# Patient Record
Sex: FEMALE | Race: WHITE | NOT HISPANIC OR LATINO | URBAN - METROPOLITAN AREA
[De-identification: names, ages, dates, MRNs, and addresses within clinical notes are randomized per-mention and may not be internally consistent; named-entity substitution may affect disease eponyms.]

---

## 2021-05-28 ENCOUNTER — EMERGENCY (EMERGENCY)
Facility: HOSPITAL | Age: 21
LOS: 0 days | Discharge: HOME | End: 2021-05-28
Attending: EMERGENCY MEDICINE | Admitting: EMERGENCY MEDICINE
Payer: MEDICAID

## 2021-05-28 VITALS
HEIGHT: 64 IN | WEIGHT: 104.94 LBS | SYSTOLIC BLOOD PRESSURE: 122 MMHG | OXYGEN SATURATION: 100 % | RESPIRATION RATE: 18 BRPM | DIASTOLIC BLOOD PRESSURE: 79 MMHG | HEART RATE: 75 BPM | TEMPERATURE: 99 F

## 2021-05-28 DIAGNOSIS — R42 DIZZINESS AND GIDDINESS: ICD-10-CM

## 2021-05-28 DIAGNOSIS — R10.30 LOWER ABDOMINAL PAIN, UNSPECIFIED: ICD-10-CM

## 2021-05-28 DIAGNOSIS — Z87.42 PERSONAL HISTORY OF OTHER DISEASES OF THE FEMALE GENITAL TRACT: ICD-10-CM

## 2021-05-28 DIAGNOSIS — N83.201 UNSPECIFIED OVARIAN CYST, RIGHT SIDE: ICD-10-CM

## 2021-05-28 DIAGNOSIS — Z88.1 ALLERGY STATUS TO OTHER ANTIBIOTIC AGENTS STATUS: ICD-10-CM

## 2021-05-28 LAB
ALBUMIN SERPL ELPH-MCNC: 4.5 G/DL — SIGNIFICANT CHANGE UP (ref 3.5–5.2)
ALP SERPL-CCNC: 43 U/L — SIGNIFICANT CHANGE UP (ref 30–115)
ALT FLD-CCNC: 8 U/L — SIGNIFICANT CHANGE UP (ref 0–41)
ANION GAP SERPL CALC-SCNC: 11 MMOL/L — SIGNIFICANT CHANGE UP (ref 7–14)
APPEARANCE UR: CLEAR — SIGNIFICANT CHANGE UP
AST SERPL-CCNC: 15 U/L — SIGNIFICANT CHANGE UP (ref 0–41)
BASOPHILS # BLD AUTO: 0.06 K/UL — SIGNIFICANT CHANGE UP (ref 0–0.2)
BASOPHILS NFR BLD AUTO: 0.6 % — SIGNIFICANT CHANGE UP (ref 0–1)
BILIRUB SERPL-MCNC: 0.4 MG/DL — SIGNIFICANT CHANGE UP (ref 0.2–1.2)
BILIRUB UR-MCNC: NEGATIVE — SIGNIFICANT CHANGE UP
BUN SERPL-MCNC: 15 MG/DL — SIGNIFICANT CHANGE UP (ref 10–20)
CALCIUM SERPL-MCNC: 9.5 MG/DL — SIGNIFICANT CHANGE UP (ref 8.5–10.1)
CHLORIDE SERPL-SCNC: 106 MMOL/L — SIGNIFICANT CHANGE UP (ref 98–110)
CO2 SERPL-SCNC: 20 MMOL/L — SIGNIFICANT CHANGE UP (ref 17–32)
COLOR SPEC: SIGNIFICANT CHANGE UP
CREAT SERPL-MCNC: 0.8 MG/DL — SIGNIFICANT CHANGE UP (ref 0.7–1.5)
DIFF PNL FLD: NEGATIVE — SIGNIFICANT CHANGE UP
EOSINOPHIL # BLD AUTO: 0.06 K/UL — SIGNIFICANT CHANGE UP (ref 0–0.7)
EOSINOPHIL NFR BLD AUTO: 0.6 % — SIGNIFICANT CHANGE UP (ref 0–8)
GLUCOSE SERPL-MCNC: 98 MG/DL — SIGNIFICANT CHANGE UP (ref 70–99)
GLUCOSE UR QL: NEGATIVE — SIGNIFICANT CHANGE UP
HCG SERPL QL: NEGATIVE — SIGNIFICANT CHANGE UP
HCT VFR BLD CALC: 40.1 % — SIGNIFICANT CHANGE UP (ref 37–47)
HGB BLD-MCNC: 14.2 G/DL — SIGNIFICANT CHANGE UP (ref 12–16)
IMM GRANULOCYTES NFR BLD AUTO: 0.4 % — HIGH (ref 0.1–0.3)
KETONES UR-MCNC: ABNORMAL
LACTATE SERPL-SCNC: 1.5 MMOL/L — SIGNIFICANT CHANGE UP (ref 0.7–2)
LEUKOCYTE ESTERASE UR-ACNC: NEGATIVE — SIGNIFICANT CHANGE UP
LYMPHOCYTES # BLD AUTO: 1.78 K/UL — SIGNIFICANT CHANGE UP (ref 1.2–3.4)
LYMPHOCYTES # BLD AUTO: 17.3 % — LOW (ref 20.5–51.1)
MCHC RBC-ENTMCNC: 30.8 PG — SIGNIFICANT CHANGE UP (ref 27–31)
MCHC RBC-ENTMCNC: 35.4 G/DL — SIGNIFICANT CHANGE UP (ref 32–37)
MCV RBC AUTO: 87 FL — SIGNIFICANT CHANGE UP (ref 81–99)
MONOCYTES # BLD AUTO: 0.93 K/UL — HIGH (ref 0.1–0.6)
MONOCYTES NFR BLD AUTO: 9 % — SIGNIFICANT CHANGE UP (ref 1.7–9.3)
NEUTROPHILS # BLD AUTO: 7.41 K/UL — HIGH (ref 1.4–6.5)
NEUTROPHILS NFR BLD AUTO: 72.1 % — SIGNIFICANT CHANGE UP (ref 42.2–75.2)
NITRITE UR-MCNC: NEGATIVE — SIGNIFICANT CHANGE UP
NRBC # BLD: 0 /100 WBCS — SIGNIFICANT CHANGE UP (ref 0–0)
PH UR: 7.5 — SIGNIFICANT CHANGE UP (ref 5–8)
PLATELET # BLD AUTO: 256 K/UL — SIGNIFICANT CHANGE UP (ref 130–400)
POTASSIUM SERPL-MCNC: 4.1 MMOL/L — SIGNIFICANT CHANGE UP (ref 3.5–5)
POTASSIUM SERPL-SCNC: 4.1 MMOL/L — SIGNIFICANT CHANGE UP (ref 3.5–5)
PROT SERPL-MCNC: 6.4 G/DL — SIGNIFICANT CHANGE UP (ref 6–8)
PROT UR-MCNC: NEGATIVE — SIGNIFICANT CHANGE UP
RBC # BLD: 4.61 M/UL — SIGNIFICANT CHANGE UP (ref 4.2–5.4)
RBC # FLD: 11.6 % — SIGNIFICANT CHANGE UP (ref 11.5–14.5)
SODIUM SERPL-SCNC: 137 MMOL/L — SIGNIFICANT CHANGE UP (ref 135–146)
SP GR SPEC: 1.01 — SIGNIFICANT CHANGE UP (ref 1.01–1.03)
UROBILINOGEN FLD QL: SIGNIFICANT CHANGE UP
WBC # BLD: 10.28 K/UL — SIGNIFICANT CHANGE UP (ref 4.8–10.8)
WBC # FLD AUTO: 10.28 K/UL — SIGNIFICANT CHANGE UP (ref 4.8–10.8)

## 2021-05-28 PROCEDURE — 76770 US EXAM ABDO BACK WALL COMP: CPT | Mod: 26

## 2021-05-28 PROCEDURE — 76830 TRANSVAGINAL US NON-OB: CPT | Mod: 26

## 2021-05-28 PROCEDURE — 99285 EMERGENCY DEPT VISIT HI MDM: CPT

## 2021-05-28 RX ORDER — IOHEXOL 300 MG/ML
30 INJECTION, SOLUTION INTRAVENOUS ONCE
Refills: 0 | Status: COMPLETED | OUTPATIENT
Start: 2021-05-28 | End: 2021-05-28

## 2021-05-28 RX ORDER — MORPHINE SULFATE 50 MG/1
4 CAPSULE, EXTENDED RELEASE ORAL ONCE
Refills: 0 | Status: DISCONTINUED | OUTPATIENT
Start: 2021-05-28 | End: 2021-05-28

## 2021-05-28 RX ORDER — SODIUM CHLORIDE 9 MG/ML
1000 INJECTION, SOLUTION INTRAVENOUS ONCE
Refills: 0 | Status: COMPLETED | OUTPATIENT
Start: 2021-05-28 | End: 2021-05-28

## 2021-05-28 RX ORDER — ONDANSETRON 8 MG/1
4 TABLET, FILM COATED ORAL ONCE
Refills: 0 | Status: COMPLETED | OUTPATIENT
Start: 2021-05-28 | End: 2021-05-28

## 2021-05-28 RX ADMIN — SODIUM CHLORIDE 1000 MILLILITER(S): 9 INJECTION, SOLUTION INTRAVENOUS at 19:23

## 2021-05-28 RX ADMIN — MORPHINE SULFATE 4 MILLIGRAM(S): 50 CAPSULE, EXTENDED RELEASE ORAL at 19:23

## 2021-05-28 RX ADMIN — ONDANSETRON 4 MILLIGRAM(S): 8 TABLET, FILM COATED ORAL at 19:23

## 2021-05-28 RX ADMIN — IOHEXOL 30 MILLILITER(S): 300 INJECTION, SOLUTION INTRAVENOUS at 20:29

## 2021-05-28 NOTE — ED PROVIDER NOTE - CARE PROVIDER_API CALL
TOSHIA CHUCHO  [id number]  [id number]  [place] [address]  Phone: ()-  Fax: ()-  Follow Up Time:    [facility]  [id number]  [id number]  [place] [address]  Phone: ()-  Fax: ()-  Follow Up Time:     FRACISCO CASTANO  [id number]  [address]  OMAIRA YAP, Phys,    Phone: ()-  Fax: ()-  Follow Up Time:

## 2021-05-28 NOTE — ED PROVIDER NOTE - OBJECTIVE STATEMENT
19 yo F with PMHx of ruptured L ovarian cyst, G0 who presents with acute onset of severe suprapubic pain radiating to epigastrium which started 45min prior to arrival immediately after having sexual intercourse. Sx worse with movement/lying flat. No fever, chills, nausea, vomiting, dysuria, vaginal bleeding/discharge, diarrhea. No FB insertion. LMP 35 days ago. 21 yo F with PMHx of ruptured L ovarian cyst, G0 who presents with acute onset of severe suprapubic pain radiating to epigastrium which started 45min prior to arrival immediately after having sexual intercourse. Sx worse with movement/lying flat. No fever, chills, nausea, vomiting, dysuria, vaginal bleeding/discharge, diarrhea. No FB insertion. LMP 35 days ago.    OBGYN: Dr. Duran

## 2021-05-28 NOTE — ED PROVIDER NOTE - PROVIDER TOKENS
PROVIDER:[TOKEN:[[id number]]] PROVIDER:[TOKEN:[[id number]]],PROVIDER:[TOKEN:[[id number]]]

## 2021-05-28 NOTE — ED PROVIDER NOTE - PHYSICAL EXAMINATION
CONSTITUTIONAL: well developed, nontoxic appearing, in no acute distress, speaking in full sentences  SKIN: warm, dry, no rash, cap refill < 2 seconds  HEENT: normocephalic, atraumatic, no conjunctival erythema, moist mucous membranes, patent airway  NECK: supple  CV:  regular rate, regular rhythm, 2+ radial pulses bilaterally  RESP: no wheezes, no rales, no rhonchi, normal work of breathing  ABD: soft, suprapubic and epigastric tenderness, nondistended, no rebound, no guarding  MSK: normal ROM, no cyanosis, no edema  NEURO: alert, oriented, grossly unremarkable  PSYCH: cooperative, appropriate, tearful

## 2021-05-28 NOTE — ED PROVIDER NOTE - PATIENT PORTAL LINK FT
You can access the FollowMyHealth Patient Portal offered by [facility] by registering at the following website: [email or web address]. By joining [employer]’s FollowMyHealth portal, you will also be able to view your health information using other applications (apps) compatible with our system.

## 2021-05-28 NOTE — ED PROVIDER NOTE - PROGRESS NOTE DETAILS
TC: 19 yo F with PMHx of ruptured L ovarian cyst, G0 who presents with acute onset suprapubic pain after sexual intercourse. Vitals wnl in ED. No peritonitic signs. Ordered labs, urine, US. Given IVF, zofran, morphine (pt states she gets nauseous after opiates). Will reassess. TC: Reassessed pt, reports pain now improved to 1-2/10 in severity although now feels some RLQ pain. Labs wnl. Discussed starting po contrast now for possible CT abd in case US is equivocal and pt is amenable with plan. TC: US shows ruptured R ovarian cyst. Repeat exam improved. Pt also with bilateral hydro which can be seen with full bladder (US performed on full bladder). Less likely bilateral obstructing stones. Urine sent out. TC: US shows ruptured R ovarian cyst. Repeat exam improved. Pt also with bilateral hydro which can be seen with full bladder (US performed on full bladder). Less likely bilateral obstructing stones. Ua negative. Instructed to f/u with OBGYN for cyst and PMD for repeat US if needed. Strict ED return precautions given. Pt verbalized understanding and was agreeable with plan.

## 2021-05-28 NOTE — ED PROVIDER NOTE - ATTENDING CONTRIBUTION TO CARE
21yo F with PMHx ruptured ovarian cyst, presents for sharp suprapubic pain radiating to epigastrium for past hour. Pain began immediately after having sexual intercourse, worse with movement and palpation. LMP 35 days ago, patient is on OCPs, states periods are normally about 33-35 days apart. Also notes she has been having right pelvic pain for past few weeks intermittently for few weeks, attributed it to cyst. Denies fever, headache, lightheadedness, CP, SOB, cough, nausea, vomiting, diarrhea, dysuria, hematuria, abnormal vaginal bleeding, discharge.     Vital signs reviewed  GENERAL: Patient nontoxic appearing, NAD  HEAD: NCAT  EYES: Anicteric  ENT: MMM  RESPIRATORY: Normal respiratory effort. CTA B/L. No wheezing, rales, rhonchi  CARDIOVASCULAR: Regular rate and rhythm  ABDOMEN: Soft. Nondistended. Suprapubic tenderness. No guarding or rebound. No CVA tenderness.  MUSCULOSKELETAL/EXTREMITIES: Brisk cap refill.   SKIN:  Warm and dry  NEURO: AAOx3. No gross FND.

## 2021-05-28 NOTE — ED PROVIDER NOTE - DATE/TIME 3
Please call patient and let her know she needs to make her appointment with neurology. I will refill her medication one more time. Medications won't be prescribed unless neurology give input as how to treat her chronic headaches.    28-May-2021 22:04

## 2021-05-28 NOTE — ED PROVIDER NOTE - NSFOLLOWUPINSTRUCTIONS_ED_ALL_ED_FT
Ruptured Ovarian Cyst    WHAT YOU NEED TO KNOW:    What is a ruptured ovarian cyst? A ruptured ovarian cyst is a cyst that breaks open. A cyst is a sac that grows on an ovary. This sac usually contains fluid, but may sometimes have blood or tissue in it. A large cyst that ruptures may lead to problems that need immediate care.    What causes or increases my risk for a ruptured ovarian cyst? You may be at higher risk for a ruptured ovarian cyst if you have polycystic ovarian syndrome (PCOS). PCOS causes many cysts to grow on your ovary. Any of the following can lead to a ruptured cyst:     - Hormone changes around the time of your monthly period   - Pressure on the cyst from sports, sex, or an injury to the area (usually large cysts)   - Pregnancy    What are the signs and symptoms of a ruptured ovarian cyst? You may have no signs or symptoms, or you may have any of the following:     - Pain that can range from mild to severe or be mild at first but become severe quickly   - Sudden, sharp, or stabbing pain that happens on one side   - Pain that starts during activity or sex, or that gets worse when you move   - Tenderness in the area of your ovary   - A low fever   - Nausea, vomiting, or dizziness    How is a ruptured ovarian cyst diagnosed? Your healthcare provider will examine you and ask about your symptoms. If you have pain, tell your provider what you were doing when you first felt the pain. Include anything that helps or increases the pain. Tell your provider if you or anyone in your family has a history of breast or ovarian cancer, or PCOS. You may need any of the following:     Blood tests are used to check for pregnancy or an ectopic pregnancy. This is when a fertilized egg is growing in a fallopian tube instead of the womb. You may also need to have hormone levels checked. Blood tests may also be used to check for signs of an infection or tumor.     Ultrasound pictures may show a cyst on your ovary. For this test, an ultrasound wand is inserted into your vagina and guided up toward your uterus. This helps your healthcare provider get a closer look at your ovaries.     How is a ruptured ovarian cyst treated? Treatment depends on your age, the size of the cyst, and if it caused problems that need treatment. Treatment may not be needed if the cyst was small or your body absorbed the fluid that came out of the cyst when it ruptured. You may need any of the following:     NSAIDs, such as ibuprofen, help decrease swelling, pain, and fever. This medicine is available with or without a doctor's order. NSAIDs can cause stomach bleeding or kidney problems in certain people. If you take blood thinner medicine, always ask if NSAIDs are safe for you. Always read the medicine label and follow directions. Do not give these medicines to children under 6 months of age without direction from your child's healthcare provider.    Prescription pain medicine may be given. Ask your healthcare provider how to take this medicine safely. Some prescription pain medicines contain acetaminophen. Do not take other medicines that contain acetaminophen without talking to your healthcare provider. Too much acetaminophen may cause liver damage. Prescription pain medicine may cause constipation. Ask your healthcare provider how to prevent or treat constipation.     Antibiotics may be needed to prevent or fight an infection caused by bacteria.     Surgery may be needed to remove fluid or blood in the area of the ruptured cyst. The outside of the ruptured cyst may also need to be removed.     What can I do to manage or prevent a ruptured ovarian cyst?     Apply heat where you have pain, as directed. Heat can help relieve mild pain. Use a heating pad (set on low) or hot water bottle. Wrap the pad or bottle in a towel before you apply it to your skin. Apply heat for 20 minutes every hour, or as directed. A warm bath may also help relieve the pain.    Ask when to come in for a follow-up examination. You may need another ultrasound 6 weeks after your cyst was treated. This will help make sure the cyst is no longer growing or causing health problems. You may also need ultrasound tests for 2 or 3 monthly periods to see how hormones affect your ovaries.    Ask about birth control pills. These may help reduce your risk for cysts. Ask your healthcare provider if birth control pills are right for you. The risk for a blood clot is higher if you take birth control pills, especially if you are older than 35 or smoke.     Have a pelvic exam every year. This may also be called a well woman visit. The exam will include a Pap smear to check for certain cancers. Your healthcare provider will also press on your abdomen to check for lumps or other problems. A pelvic exam can help find problems early. This makes treatment easier and more effective. Tell your healthcare provider if you notice any changes in your monthly periods. Examples include periods that start on a different day than usual, or are lighter or heavier than usual. Tell your provider if you have worse pain than usual, or if the pain is different than you had before.    Call 911 for any of the following:   - You are too weak or dizzy to stand up.    When should I seek immediate care?   - You have severe pain in your pelvis or in your abdomen.   - You have pain along with a fever, nausea, or vomiting.   - You have signs of shock from blood loss, such as dizziness, cold or clammy skin, or fast breathing.    When should I contact my healthcare provider?   - You notice changes in your monthly periods, or you begin to have nausea or vomiting with your periods.  - You have new or worsening symptoms.  - Your pain does not get better with pain medicine.  - You have pain during sex.  - You have bleeding from your vagina that is not your period.  - Your abdomen is swollen, or you have a full or heavy feeling in your lower abdomen.  - You have trouble urinating.  - You have questions or concerns about your condition or care. Ruptured Ovarian Cyst    WHAT YOU NEED TO KNOW:    What is a ruptured ovarian cyst? A ruptured ovarian cyst is a cyst that breaks open. A cyst is a sac that grows on an ovary. This sac usually contains fluid, but may sometimes have blood or tissue in it. A large cyst that ruptures may lead to problems that need immediate care.    What causes or increases my risk for a ruptured ovarian cyst? You may be at higher risk for a ruptured ovarian cyst if you have polycystic ovarian syndrome (PCOS). PCOS causes many cysts to grow on your ovary. Any of the following can lead to a ruptured cyst:     - Hormone changes around the time of your monthly period   - Pressure on the cyst from sports, sex, or an injury to the area (usually large cysts)   - Pregnancy    What are the signs and symptoms of a ruptured ovarian cyst? You may have no signs or symptoms, or you may have any of the following:     - Pain that can range from mild to severe or be mild at first but become severe quickly   - Sudden, sharp, or stabbing pain that happens on one side   - Pain that starts during activity or sex, or that gets worse when you move   - Tenderness in the area of your ovary   - A low fever   - Nausea, vomiting, or dizziness    How is a ruptured ovarian cyst diagnosed? Your healthcare provider will examine you and ask about your symptoms. If you have pain, tell your provider what you were doing when you first felt the pain. Include anything that helps or increases the pain. Tell your provider if you or anyone in your family has a history of breast or ovarian cancer, or PCOS. You may need any of the following:     Blood tests are used to check for pregnancy or an ectopic pregnancy. This is when a fertilized egg is growing in a fallopian tube instead of the womb. You may also need to have hormone levels checked. Blood tests may also be used to check for signs of an infection or tumor.     Ultrasound pictures may show a cyst on your ovary. For this test, an ultrasound wand is inserted into your vagina and guided up toward your uterus. This helps your healthcare provider get a closer look at your ovaries.     How is a ruptured ovarian cyst treated? Treatment depends on your age, the size of the cyst, and if it caused problems that need treatment. Treatment may not be needed if the cyst was small or your body absorbed the fluid that came out of the cyst when it ruptured. You may need any of the following:     NSAIDs, such as ibuprofen, help decrease swelling, pain, and fever. This medicine is available with or without a doctor's order. NSAIDs can cause stomach bleeding or kidney problems in certain people. If you take blood thinner medicine, always ask if NSAIDs are safe for you. Always read the medicine label and follow directions. Do not give these medicines to children under 6 months of age without direction from your child's healthcare provider.    Prescription pain medicine may be given. Ask your healthcare provider how to take this medicine safely. Some prescription pain medicines contain acetaminophen. Do not take other medicines that contain acetaminophen without talking to your healthcare provider. Too much acetaminophen may cause liver damage. Prescription pain medicine may cause constipation. Ask your healthcare provider how to prevent or treat constipation.     Antibiotics may be needed to prevent or fight an infection caused by bacteria.     Surgery may be needed to remove fluid or blood in the area of the ruptured cyst. The outside of the ruptured cyst may also need to be removed.     What can I do to manage or prevent a ruptured ovarian cyst?     Apply heat where you have pain, as directed. Heat can help relieve mild pain. Use a heating pad (set on low) or hot water bottle. Wrap the pad or bottle in a towel before you apply it to your skin. Apply heat for 20 minutes every hour, or as directed. A warm bath may also help relieve the pain.    Ask when to come in for a follow-up examination. You may need another ultrasound 6 weeks after your cyst was treated. This will help make sure the cyst is no longer growing or causing health problems. You may also need ultrasound tests for 2 or 3 monthly periods to see how hormones affect your ovaries.    Ask about birth control pills. These may help reduce your risk for cysts. Ask your healthcare provider if birth control pills are right for you. The risk for a blood clot is higher if you take birth control pills, especially if you are older than 35 or smoke.     Have a pelvic exam every year. This may also be called a well woman visit. The exam will include a Pap smear to check for certain cancers. Your healthcare provider will also press on your abdomen to check for lumps or other problems. A pelvic exam can help find problems early. This makes treatment easier and more effective. Tell your healthcare provider if you notice any changes in your monthly periods. Examples include periods that start on a different day than usual, or are lighter or heavier than usual. Tell your provider if you have worse pain than usual, or if the pain is different than you had before.    Call 911 for any of the following:   - You are too weak or dizzy to stand up.    When should I seek immediate care?   - You have severe pain in your pelvis or in your abdomen.   - You have pain along with a fever, nausea, or vomiting.   - You have signs of shock from blood loss, such as dizziness, cold or clammy skin, or fast breathing.    When should I contact my healthcare provider?   - You notice changes in your monthly periods, or you begin to have nausea or vomiting with your periods.  - You have new or worsening symptoms.  - Your pain does not get better with pain medicine.  - You have pain during sex.  - You have bleeding from your vagina that is not your period.  - Your abdomen is swollen, or you have a full or heavy feeling in your lower abdomen.  - You have trouble urinating.  - You have questions or concerns about your condition or care.    Hydronephrosis    WHAT YOU NEED TO KNOW:    What is hydronephrosis? Hydronephrosis is swelling in one or both kidneys caused by urine buildup. Urine normally flows from the kidneys to the bladder through tubes called ureters. A blockage in the ureters can prevent urine from flowing properly. Urine flow may also be prevented or slowed if your kidneys do not work correctly. Urine flows back into your urinary tract. Pressure builds up in the kidney and causes swelling.     What increases my risk for hydronephrosis?   •Nerve damage or narrowed blood vessels  •Kidney stones, blood clots, or tumors that cause a blockage  •Urinary tract infections  •Body changes during pregnancy  •Enlarged prostate    What are the signs and symptoms of hydronephrosis? You may have no signs or symptoms, or you may have any of the following:   •Frequent urinary tract infections  •Mild or severe lower back pain that may spread to the groin  •Urinating little or not at all, even with an urge to urinate  •Dribbling urine, or loss of urine control  •Blood or pus in your urine  •Nausea, vomiting, fever, or chills  •Abdominal fullness or swelling  •Weight gain that you cannot explain    How is hydronephrosis diagnosed? Your healthcare provider will examine you and ask you about your signs and symptoms. He may also feel your abdomen or pelvis for any pain or swelling. You may also need any of the following:   •Blood tests show if your kidneys are working properly or have a blockage.   •Kidney function tests show how well your kidneys are working.  •X-rays may be taken of your kidneys, bladder, and ureters. You may need to have dye injected into your kidneys before the x-rays to help healthcare providers find the blockage. Tell the healthcare provider if you have ever had an allergic reaction to contrast dye.  •Urine tests show how much urine your body is removing. They may also show if you have infection, blood, or protein in your urine. This may mean your kidneys are not working as they should.  •A CT scan (CAT scan) uses an x-ray and a computer to take pictures of your kidneys, bladder, and ureters. The pictures may show a kidney stone or other obstruction.  •An ultrasound may be used to show your kidney or bladder size, and if either is swollen. Ultrasound can also be used to find kidney stones. You may need to have a CT and an ultrasound together to find a blockage.     How is hydronephrosis treated? Treatment may help keep your kidneys healthy, and prevent infection. You may need the following:   •A renal diet is a meal plan that includes foods that are low in sodium (salt), potassium, and protein. Your healthcare provider may also tell you to eat and drink more vegetables and juices.     •Stone removal may be used to remove the kidney stones that are slowing or blocking your urine flow. Your healthcare provider may use strong sound waves called shock wave therapy to break up large kidney stones. This will help make them small enough for you to pass them when you urinate.    •Catheter or stent placement may be needed to help increase your urine flow. You may need a catheter (flexible tube) placed directly into your bladder to drain urine. Your healthcare provider may place a hard plastic tube called a stent inside your urinary tract to help urine pass from your kidney to your bladder.    •Surgery may be needed to remove part or all of your kidney if it is not working properly. Your prostate may need to be removed if it is so large that it is blocking urine flow.     What are the risks of hydronephrosis? Swelling in one or both kidneys from too much urine buildup may lead to long-term kidney damage. Partial blockages may cause loss of urine control. Severe hydronephrosis may cause a blood infection called sepsis. Sepsis is toxin (poison) buildup in your blood. It happens when your kidneys cannot flush toxins out of your body. It could also paralyze your intestines. Your kidneys could fail without treatment. These conditions may be life-threatening.    When should I contact my healthcare provider?   •Your abdomen feels full.  •You have a change in how much or how often you urinate.  •You urinate more times at night and in larger amounts than during the day.  •You have mild lower back pain or pain on one side when you urinate.    When should I seek immediate care?   •You have severe, stabbing back pain.  •You have blood in your urine.  •You cannot urinate, or you urinate very little.    CARE AGREEMENT:    You have the right to help plan your care. Learn about your health condition and how it may be treated. Discuss treatment options with your healthcare providers to decide what care you want to receive. You always have the right to refuse treatment.      © Copyright [employer] 2021

## 2021-05-28 NOTE — ED PROVIDER NOTE - NS ED ROS FT
GEN:  no fever, no chills  NEURO:  no headache, + dizziness  ENT: no sore throat, no runny nose  CV:  no chest pain, no palpitations  RESP:  no sob, no cough  GI:  no nausea, no vomiting, + abdominal pain, no diarrhea  :  no dysuria, no urinary frequency, no hematuria  MSK:  no joint pain, no edema  SKIN:  no rash, no bruising  HEME: no hematochezia, no melena

## 2021-05-29 LAB
CULTURE RESULTS: SIGNIFICANT CHANGE UP
SPECIMEN SOURCE: SIGNIFICANT CHANGE UP

## 2022-08-03 ENCOUNTER — EMERGENCY (EMERGENCY)
Facility: HOSPITAL | Age: 22
LOS: 0 days | Discharge: HOME | End: 2022-08-03
Attending: EMERGENCY MEDICINE | Admitting: EMERGENCY MEDICINE

## 2022-08-03 VITALS
RESPIRATION RATE: 18 BRPM | HEART RATE: 98 BPM | HEIGHT: 64 IN | TEMPERATURE: 99 F | OXYGEN SATURATION: 98 % | DIASTOLIC BLOOD PRESSURE: 73 MMHG | SYSTOLIC BLOOD PRESSURE: 119 MMHG

## 2022-08-03 DIAGNOSIS — R30.0 DYSURIA: ICD-10-CM

## 2022-08-03 DIAGNOSIS — R35.0 FREQUENCY OF MICTURITION: ICD-10-CM

## 2022-08-03 DIAGNOSIS — Z88.0 ALLERGY STATUS TO PENICILLIN: ICD-10-CM

## 2022-08-03 DIAGNOSIS — R31.9 HEMATURIA, UNSPECIFIED: ICD-10-CM

## 2022-08-03 PROBLEM — N83.209 UNSPECIFIED OVARIAN CYST, UNSPECIFIED SIDE: Chronic | Status: ACTIVE | Noted: 2021-05-28

## 2022-08-03 LAB
APPEARANCE UR: CLEAR — SIGNIFICANT CHANGE UP
BILIRUB UR-MCNC: NEGATIVE — SIGNIFICANT CHANGE UP
COLOR SPEC: SIGNIFICANT CHANGE UP
DIFF PNL FLD: NEGATIVE — SIGNIFICANT CHANGE UP
GLUCOSE UR QL: NEGATIVE — SIGNIFICANT CHANGE UP
KETONES UR-MCNC: NEGATIVE — SIGNIFICANT CHANGE UP
LEUKOCYTE ESTERASE UR-ACNC: NEGATIVE — SIGNIFICANT CHANGE UP
NITRITE UR-MCNC: NEGATIVE — SIGNIFICANT CHANGE UP
PH UR: 6.5 — SIGNIFICANT CHANGE UP (ref 5–8)
PROT UR-MCNC: NEGATIVE — SIGNIFICANT CHANGE UP
SP GR SPEC: 1.01 — SIGNIFICANT CHANGE UP (ref 1.01–1.03)
UROBILINOGEN FLD QL: SIGNIFICANT CHANGE UP

## 2022-08-03 PROCEDURE — 99284 EMERGENCY DEPT VISIT MOD MDM: CPT

## 2022-08-03 RX ORDER — CEFPODOXIME PROXETIL 100 MG
1 TABLET ORAL
Qty: 20 | Refills: 0
Start: 2022-08-03 | End: 2022-08-12

## 2022-08-03 NOTE — ED PROVIDER NOTE - NS ED ROS FT
Review of Systems   Constitutional:  No Weight Change, No Fever, No Chills, No weakness    Cardiovascular:  No Chest Pain,  Respiratory:  No SOB, No Cough,   Gastrointestinal:  No Nausea, No Vomiting, No Diarrhea, No Constipation, No abdominal pain,   Genitourinary:  + Dysuria, + Urinary Frequency, + Hematuria, No Urinary Incontinence,  Musculoskeletal:  No Arthralgias, No Myalgias, No Joint Swelling, No Joint Stiffness,  Skin:  No rashes

## 2022-08-03 NOTE — ED PROVIDER NOTE - PATIENT PORTAL LINK FT
You can access the FollowMyHealth Patient Portal offered by [facility] by registering at the following website: [email or web address]. By joining [employer]’s FollowMyHealth portal, you will also be able to view your health information using other applications (apps) compatible with our system.

## 2022-08-03 NOTE — ED PROVIDER NOTE - PHYSICAL EXAMINATION
Const: NAD  Eyes: PERRL, no conjunctival injection  HENT:  Neck supple without meningismus   CV: RRR, Warm, well-perfused extremities  RESP: CTA B/L, no tachypnea   GI: soft, non-tender, non-distended, no CVA tenderness  MSK: No gross deformities appreciated  Skin: Warm, dry. No rashes  Neuro: Alert, CNs II-XII grossly intact. Sensation and motor function of extremities grossly intact.  Psych: Appropriate mood and affect.

## 2022-08-03 NOTE — ED PROVIDER NOTE - OBJECTIVE STATEMENT
21-year-old female with no past medical history presents to ED for dysuria that started on Monday.  Patient describes that she has had increased frequency dysuria and hematuria.  Patient noticed some small blood clots.  Patient states starting today she noticed some aching left flank pain.  No fever no chills no abdominal pain no vaginal discharge no nausea no vomiting.  Patient's never had a UTI.  No history of kidney stones.
